# Patient Record
Sex: MALE | URBAN - METROPOLITAN AREA
[De-identification: names, ages, dates, MRNs, and addresses within clinical notes are randomized per-mention and may not be internally consistent; named-entity substitution may affect disease eponyms.]

---

## 2021-12-24 ENCOUNTER — EMERGENCY (EMERGENCY)
Facility: HOSPITAL | Age: 61
LOS: 1 days | Discharge: HOME | End: 2021-12-24
Attending: EMERGENCY MEDICINE
Payer: COMMERCIAL

## 2021-12-24 VITALS
DIASTOLIC BLOOD PRESSURE: 78 MMHG | SYSTOLIC BLOOD PRESSURE: 112 MMHG | RESPIRATION RATE: 18 BRPM | HEART RATE: 67 BPM | OXYGEN SATURATION: 100 %

## 2021-12-24 VITALS
HEIGHT: 71 IN | WEIGHT: 175.05 LBS | SYSTOLIC BLOOD PRESSURE: 122 MMHG | DIASTOLIC BLOOD PRESSURE: 65 MMHG | OXYGEN SATURATION: 100 % | RESPIRATION RATE: 20 BRPM | HEART RATE: 55 BPM

## 2021-12-24 LAB
ALBUMIN SERPL ELPH-MCNC: 4 G/DL — SIGNIFICANT CHANGE UP (ref 3.3–5)
ALP SERPL-CCNC: 92 U/L — SIGNIFICANT CHANGE UP (ref 40–120)
ALT FLD-CCNC: 11 U/L — SIGNIFICANT CHANGE UP (ref 10–45)
ANION GAP SERPL CALC-SCNC: 15 MMOL/L — SIGNIFICANT CHANGE UP (ref 5–17)
APTT BLD: 29.7 SEC — SIGNIFICANT CHANGE UP (ref 27.5–35.5)
AST SERPL-CCNC: 20 U/L — SIGNIFICANT CHANGE UP (ref 10–40)
BASOPHILS # BLD AUTO: 0.02 K/UL — SIGNIFICANT CHANGE UP (ref 0–0.2)
BASOPHILS NFR BLD AUTO: 0.4 % — SIGNIFICANT CHANGE UP (ref 0–2)
BILIRUB SERPL-MCNC: 0.1 MG/DL — LOW (ref 0.2–1.2)
BUN SERPL-MCNC: 29 MG/DL — HIGH (ref 7–23)
CALCIUM SERPL-MCNC: 8.8 MG/DL — SIGNIFICANT CHANGE UP (ref 8.4–10.5)
CHLORIDE SERPL-SCNC: 100 MMOL/L — SIGNIFICANT CHANGE UP (ref 96–108)
CO2 SERPL-SCNC: 23 MMOL/L — SIGNIFICANT CHANGE UP (ref 22–31)
CREAT SERPL-MCNC: 1.08 MG/DL — SIGNIFICANT CHANGE UP (ref 0.5–1.3)
EOSINOPHIL # BLD AUTO: 0.01 K/UL — SIGNIFICANT CHANGE UP (ref 0–0.5)
EOSINOPHIL NFR BLD AUTO: 0.2 % — SIGNIFICANT CHANGE UP (ref 0–6)
GLUCOSE SERPL-MCNC: 138 MG/DL — HIGH (ref 70–99)
HCT VFR BLD CALC: 41.4 % — SIGNIFICANT CHANGE UP (ref 39–50)
HGB BLD-MCNC: 13.5 G/DL — SIGNIFICANT CHANGE UP (ref 13–17)
HIV 1 & 2 AB SERPL IA.RAPID: SIGNIFICANT CHANGE UP
IMM GRANULOCYTES NFR BLD AUTO: 0.5 % — SIGNIFICANT CHANGE UP (ref 0–1.5)
INR BLD: 1.11 RATIO — SIGNIFICANT CHANGE UP (ref 0.88–1.16)
LYMPHOCYTES # BLD AUTO: 0.86 K/UL — LOW (ref 1–3.3)
LYMPHOCYTES # BLD AUTO: 15.5 % — SIGNIFICANT CHANGE UP (ref 13–44)
MCHC RBC-ENTMCNC: 28.1 PG — SIGNIFICANT CHANGE UP (ref 27–34)
MCHC RBC-ENTMCNC: 32.6 GM/DL — SIGNIFICANT CHANGE UP (ref 32–36)
MCV RBC AUTO: 86.1 FL — SIGNIFICANT CHANGE UP (ref 80–100)
MONOCYTES # BLD AUTO: 0.48 K/UL — SIGNIFICANT CHANGE UP (ref 0–0.9)
MONOCYTES NFR BLD AUTO: 8.6 % — SIGNIFICANT CHANGE UP (ref 2–14)
NEUTROPHILS # BLD AUTO: 4.16 K/UL — SIGNIFICANT CHANGE UP (ref 1.8–7.4)
NEUTROPHILS NFR BLD AUTO: 74.8 % — SIGNIFICANT CHANGE UP (ref 43–77)
NRBC # BLD: 0 /100 WBCS — SIGNIFICANT CHANGE UP (ref 0–0)
PLATELET # BLD AUTO: 173 K/UL — SIGNIFICANT CHANGE UP (ref 150–400)
POTASSIUM SERPL-MCNC: 3.9 MMOL/L — SIGNIFICANT CHANGE UP (ref 3.5–5.3)
POTASSIUM SERPL-SCNC: 3.9 MMOL/L — SIGNIFICANT CHANGE UP (ref 3.5–5.3)
PROT SERPL-MCNC: 6.9 G/DL — SIGNIFICANT CHANGE UP (ref 6–8.3)
PROTHROM AB SERPL-ACNC: 13.3 SEC — SIGNIFICANT CHANGE UP (ref 10.6–13.6)
RAPID RVP RESULT: DETECTED
RBC # BLD: 4.81 M/UL — SIGNIFICANT CHANGE UP (ref 4.2–5.8)
RBC # FLD: 14.8 % — HIGH (ref 10.3–14.5)
SARS-COV-2 RNA SPEC QL NAA+PROBE: DETECTED
SODIUM SERPL-SCNC: 138 MMOL/L — SIGNIFICANT CHANGE UP (ref 135–145)
TROPONIN T, HIGH SENSITIVITY RESULT: <6 NG/L — SIGNIFICANT CHANGE UP (ref 0–51)
WBC # BLD: 5.56 K/UL — SIGNIFICANT CHANGE UP (ref 3.8–10.5)
WBC # FLD AUTO: 5.56 K/UL — SIGNIFICANT CHANGE UP (ref 3.8–10.5)

## 2021-12-24 PROCEDURE — 82962 GLUCOSE BLOOD TEST: CPT

## 2021-12-24 PROCEDURE — 93010 ELECTROCARDIOGRAM REPORT: CPT | Mod: NC,59

## 2021-12-24 PROCEDURE — G1004: CPT

## 2021-12-24 PROCEDURE — 12002 RPR S/N/AX/GEN/TRNK2.6-7.5CM: CPT

## 2021-12-24 PROCEDURE — 96360 HYDRATION IV INFUSION INIT: CPT | Mod: XU

## 2021-12-24 PROCEDURE — 99285 EMERGENCY DEPT VISIT HI MDM: CPT | Mod: 25

## 2021-12-24 PROCEDURE — 70450 CT HEAD/BRAIN W/O DYE: CPT | Mod: 26,MG

## 2021-12-24 PROCEDURE — 85730 THROMBOPLASTIN TIME PARTIAL: CPT

## 2021-12-24 PROCEDURE — 0225U NFCT DS DNA&RNA 21 SARSCOV2: CPT

## 2021-12-24 PROCEDURE — 36415 COLL VENOUS BLD VENIPUNCTURE: CPT

## 2021-12-24 PROCEDURE — 70450 CT HEAD/BRAIN W/O DYE: CPT | Mod: MG

## 2021-12-24 PROCEDURE — 84484 ASSAY OF TROPONIN QUANT: CPT

## 2021-12-24 PROCEDURE — 12013 RPR F/E/E/N/L/M 2.6-5.0 CM: CPT | Mod: 59

## 2021-12-24 PROCEDURE — 90471 IMMUNIZATION ADMIN: CPT

## 2021-12-24 PROCEDURE — 85025 COMPLETE CBC W/AUTO DIFF WBC: CPT

## 2021-12-24 PROCEDURE — 85610 PROTHROMBIN TIME: CPT

## 2021-12-24 PROCEDURE — 99284 EMERGENCY DEPT VISIT MOD MDM: CPT | Mod: 25

## 2021-12-24 PROCEDURE — 86703 HIV-1/HIV-2 1 RESULT ANTBDY: CPT

## 2021-12-24 PROCEDURE — 90715 TDAP VACCINE 7 YRS/> IM: CPT

## 2021-12-24 PROCEDURE — 80053 COMPREHEN METABOLIC PANEL: CPT

## 2021-12-24 RX ORDER — TETANUS TOXOID, REDUCED DIPHTHERIA TOXOID AND ACELLULAR PERTUSSIS VACCINE, ADSORBED 5; 2.5; 8; 8; 2.5 [IU]/.5ML; [IU]/.5ML; UG/.5ML; UG/.5ML; UG/.5ML
0.5 SUSPENSION INTRAMUSCULAR ONCE
Refills: 0 | Status: COMPLETED | OUTPATIENT
Start: 2021-12-24 | End: 2021-12-24

## 2021-12-24 RX ORDER — SODIUM CHLORIDE 9 MG/ML
1000 INJECTION INTRAMUSCULAR; INTRAVENOUS; SUBCUTANEOUS ONCE
Refills: 0 | Status: COMPLETED | OUTPATIENT
Start: 2021-12-24 | End: 2021-12-24

## 2021-12-24 RX ADMIN — SODIUM CHLORIDE 1000 MILLILITER(S): 9 INJECTION INTRAMUSCULAR; INTRAVENOUS; SUBCUTANEOUS at 15:37

## 2021-12-24 RX ADMIN — SODIUM CHLORIDE 1000 MILLILITER(S): 9 INJECTION INTRAMUSCULAR; INTRAVENOUS; SUBCUTANEOUS at 16:40

## 2021-12-24 RX ADMIN — TETANUS TOXOID, REDUCED DIPHTHERIA TOXOID AND ACELLULAR PERTUSSIS VACCINE, ADSORBED 0.5 MILLILITER(S): 5; 2.5; 8; 8; 2.5 SUSPENSION INTRAMUSCULAR at 15:20

## 2021-12-24 NOTE — ED PROVIDER NOTE - PATIENT PORTAL LINK FT
You can access the FollowMyHealth Patient Portal offered by [facility] by registering at the following website: [email or web address]. By joining [facility]’s FollowMyHealth portal, you will also be able to view your health information using other applications (apps) compatible with our system.

## 2021-12-24 NOTE — ED PROVIDER NOTE - NSFOLLOWUPINSTRUCTIONS_ED_ALL_ED_FT
You were seen in the Emergency Room today because of a fall resulting in a head laceration. 4 staples were placed. Please go to an Urgent Care or return to the Emergency Room to remove them in 1 week.     Your lab and imaging results are included in your discharge paperwork.   You can take Tylenol 650mg and/or Ibuprofen 400mg as directed for pain. You can take both every 6 hours.     Please follow-up with your Primary Care Physician within 2 days for further management of your symptoms. We also recommend following up with a Cardiologist tomorrow to further evaluate your syncope. The contact information for the Clinic is included in your discharge paperwork. We have also let them know of your case, and they will be in contact with you.     Please return to the Emergency Room if:   •You have fever or chills.   •Your pain or swelling does not go away or gets worse, even after you take medicine.  •Your wound turns blue or white or feels cold and numb.  •Your wound is bleeding or draining pus.  •Your chest pain gets worse.  •You have a cough that gets worse, or you cough up blood.  •You have severe pain in your abdomen.  •You have sudden, unexplained discomfort in your arms, back, neck, or jaw.  •You have shortness of breath at any time.  •You feel nausea or you vomit.  •You suddenly feel lightheaded or dizzy, or you faint.  •You have severe weakness, or unexplained weakness or fatigue.  •Your heart begins to beat quickly, or it feels like it is skipping beats.

## 2021-12-24 NOTE — ED PROVIDER NOTE - PHYSICAL EXAMINATION
Gen: NAD, AOx3, able to make needs known, non-toxic  HEENT: EOMI, oral mucosa moist, normal conjunctiva. +1cm lac on posterior head, no cervical spine tenderness.   CV: pulses bilaterally, no sternal tenderness, no clavicle tenderness   Abd: soft, NTND, no guarding, no CVA tenderness   MSK: no visible bony deformities, no tenderness with palpation of the bilateral UE and LE, no hip or pelvis tenderness.  Neuro: No focal sensory or motor deficits  Skin: Warm, well perfused, no rash, no bruises   Psych: normal affect Gen: NAD, AOx3, able to make needs known, non-toxic  HEENT: EOMI, oral mucosa moist, normal conjunctiva. +1in lac on posterior head, no cervical spine tenderness.   CV: pulses bilaterally, no sternal tenderness, no clavicle tenderness   Abd: soft, NTND, no guarding, no CVA tenderness   MSK: no visible bony deformities, no tenderness with palpation of the bilateral UE and LE, no hip or pelvis tenderness.  Neuro: No focal sensory or motor deficits  Skin: Warm, well perfused, no rash, no bruises   Psych: normal affect

## 2021-12-24 NOTE — ED PROVIDER NOTE - OBJECTIVE STATEMENT
62yo man no PMH, 2 days of cold symptoms, presenting after syncopizing at the pharmacy when picking up medications. Patient states that he felt dizzy and collapsed for about a minute per bystanders. +head strike. , received 300mL of NS, no meds from EMS. Does not regularly see doctors. 62yo man no PMH, 2 days of cold symptoms, presenting after syncopizing at the pharmacy when picking up medications. Patient states that he felt dizzy and collapsed for about a minute per bystanders. +head strike. , received 300mL of NS, no meds from EMS. Saw a doctor 10 years ago. Complaining of head pain. 60yo man no PMH, 2 days of cough, congestion, presenting after syncopizing at the pharmacy when picking up medications. Patient states that he felt dizzy and collapsed for about a minute per bystanders. +head strike. , received 300mL of NS, no meds from EMS. Saw a doctor 10 years ago. Complaining of head pain.

## 2021-12-24 NOTE — ED PROVIDER NOTE - NS ED ROS FT
GENERAL: No fever, no chills  EYES: No change in vision  HEENT: No trouble swallowing or speaking, +head pain   CARDIAC: No chest pain  PULMONARY: No cough, no SOB  GI: No abdominal pain, no nausea or no vomiting, no diarrhea, no constipation  : No changes in urination  SKIN: No rashes  NEURO: No headache, no numbness  MSK: +shoulder pain  Otherwise as HPI or negative.

## 2021-12-24 NOTE — ED PROVIDER NOTE - CLINICAL SUMMARY MEDICAL DECISION MAKING FREE TEXT BOX
Dorian, PGY1 - vasovagal episode, no acute distressing factor noted, no follow-up, bradycardia, concerned for cardiovascular etiology. No SOB, no tachycardia, no tachypnea, decreased suspicion for PE, pneumonia. Cough symptoms, consider COVID+, pneumonia. labs, cxr, reassess. *The above represents an initial assessment/impression. Please refer to progress notes for potential changes in patient clinical course*

## 2021-12-24 NOTE — ED ADULT NURSE NOTE - OBJECTIVE STATEMENT
1237 61 yr old WM brought to ER  via ambulance on stretcher for further eval and tx of dizziness, and syncopal episode. [facility]  [facility]  [facility] prescription. Everyone sick at home with cold symptoms. Pt was COVID neg  a few days ago. Pt not vaccinated. smokes 1 pack/day cigarettes. Has not been to a Dr in 10 yrs. Laceration to posterior scalp with bleeding. Pt struck his head when he fainted. A&Ox4. PERRL. Denies chest pain, palp, SOB, fever, chills or bodyaches. Fall risk, contact and airborne isolation maintained

## 2021-12-24 NOTE — ED PROVIDER NOTE - ATTENDING CONTRIBUTION TO CARE
Nemes - 60yo man no PMH, 2 days of cough, congestion, presenting after syncopal episode at the pharmacy when picking up medications. Patient states that he felt dizzy and collapsed for about a minute per bystanders. +head strike. , received 300mL of NS, no meds from EMS. Saw a doctor 10 years ago. Complaining of head pain.  VS wnl, well appearing, in NAD. Moist mucosae, pink conjunctivae. Neck supple, CN/neuro intact. Lungs clear, cardiac wnl, no JVD. Abdomen soft/NT, no CVAT. No pedal edema, no calf TTP.   Low suspicion for cardiac etiology of syncope, likely dehydration/viral syndrome.  Will get labs, CT head, EKG, hydrate, reevaluate.  Will discuss didpo options, CDU vs DC w f/u Nemes - 62yo man no PMH, 2 days of cough, congestion, presenting after syncopal episode at the pharmacy when picking up medications. Patient states that he felt dizzy and collapsed for about a minute per bystanders. +head strike. , received 300mL of NS, no meds from EMS. Saw a doctor 10 years ago. Complaining of head pain.  VS wnl, well appearing, in NAD. Moist mucosae, pink conjunctivae. Neck supple, no C-spine TTP. 3cm lac to posterior/occipital scalp. CN/neuro intact. Lungs clear, cardiac wnl, no JVD. Abdomen soft/NT, no CVAT. No pedal edema, no calf TTP.   Low suspicion for cardiac etiology of syncope, likely dehydration/viral syndrome.  Will get labs, CT head, EKG, hydrate, reevaluate.  Will discuss dispo options, CDU vs DC w f/u

## 2022-01-05 ENCOUNTER — EMERGENCY (EMERGENCY)
Facility: HOSPITAL | Age: 62
LOS: 1 days | Discharge: HOME | End: 2022-01-05
Attending: EMERGENCY MEDICINE
Payer: COMMERCIAL

## 2022-01-05 VITALS
HEART RATE: 104 BPM | TEMPERATURE: 98 F | OXYGEN SATURATION: 99 % | HEIGHT: 71 IN | SYSTOLIC BLOOD PRESSURE: 134 MMHG | WEIGHT: 175.05 LBS | RESPIRATION RATE: 17 BRPM | DIASTOLIC BLOOD PRESSURE: 86 MMHG

## 2022-01-05 PROCEDURE — L9995: CPT

## 2022-01-05 PROCEDURE — G0463: CPT

## 2022-01-05 NOTE — ED ADULT NURSE NOTE - OBJECTIVE STATEMENT
Patient  is  alert  and  oriented x3. Color is good and skin warm to touch.   He  sustained a laceration to his scalp about 13 days ago. He has 4 staples to his occipital area. Wound is dry , clean and healed. He  is here for staples removal.

## 2022-01-05 NOTE — ED PROVIDER NOTE - ATTENDING CONTRIBUTION TO CARE
pt is a 62 y/o male here for staple removal to occipital region of head, no signs of infection. pt without complaints, placed 4 staples 12/24.

## 2022-01-05 NOTE — ED ADULT TRIAGE NOTE - TEMPERATURE IN CELSIUS (DEGREES C)
36.8
Implemented All Universal Safety Interventions:  [place] to call system. Call bell, personal items and telephone within reach. Instruct patient to call for assistance. Room bathroom lighting operational. Non-slip footwear when patient is off stretcher. Physically safe environment: no spills, clutter or unnecessary equipment. Stretcher in lowest position, wheels locked, appropriate side rails in place.

## 2022-01-05 NOTE — ED PROVIDER NOTE - NSFOLLOWUPINSTRUCTIONS_ED_ALL_ED_FT
Please follow up with your primary care doctor as soon as possible for review of your ED visit.   Please return to the ED if you develop any new or worsening symptoms including bleeding from the wound.

## 2022-01-05 NOTE — ED PROVIDER NOTE - NS ED ROS FT
ROS:  GENERAL: No fever, no chills  EYES: no change in vision  HEENT: no trouble swallowing, no trouble speaking  CARDIAC: no chest pain  PULMONARY: no cough, no shortness of breath  GI: no abdominal pain, no nausea, no vomiting, no diarrhea, no constipation  : No dysuria, no frequency, no change in appearance, or odor of urine  SKIN: no rashes, staples in head  NEURO: no headache, no weakness  MSK: No joint pain

## 2022-01-05 NOTE — ED PROVIDER NOTE - PATIENT PORTAL LINK FT
You can access the FollowMyHealth Patient Portal offered by [facility] by registering at the following website: [email or web address]. By joining [employer]’s FollowMyHealth portal, you will also be able to view your health information using other applications (apps) compatible with our system.

## 2022-01-05 NOTE — ED PROVIDER NOTE - CLINICAL SUMMARY MEDICAL DECISION MAKING FREE TEXT BOX
See Attending Note See Attending Note    Paola: Staples removed - see procedure note.   Will DC    Will discharge. Discussed the case with patient and/or family.  Instructed urgent follow up with primary care doctor for review of their ED visit (labs, radiology, etc.) Also instructed follow up for any specialty services as needed. Patient and/or family are aware to return to ED with any new or worsening symptoms. All questions were answered and the opportunity for to ask questions were given. Patient and/or family verbalized understanding of the above instructions.

## 2022-01-05 NOTE — ED PROVIDER NOTE - PHYSICAL EXAMINATION
I have review the triage vital signs   Const: Well nourished and developed. Appears stated age. Awake, alert, in no acute distress.  Head: Normocephalic and atraumatic.  Eyes: No conjunctival pallor, PERRL, EOMI  Ear, Nose, Throat: Normal appearing externally. No external throat swelling.  Neck: Ranging without difficulty or pain.   Respiratory: No acute respiratory distress. Lungs CTAB.  Cardiovascular: Regular rate and rhythm. No edema.   Abdominal: Soft, nontender, nondistended. No rebound or guarding.  MSK: No gross deformity   Neuro: The patient is alert, conversive, moving all extremities equally and spontaneously   Skin: Warm, dry, intact. 4 staples along occiput. wound healed well,

## 2022-01-05 NOTE — ED PROVIDER NOTE - OBJECTIVE STATEMENT
61yM presents for suture removal, he was seen in the ED 12/24 for syncope where he fell backwards and sustained a laceration to the back of his head. 4 staples were placed at that time. He was offered admission at that time but declined. He was done well since the visit. No pain to the head. No bleeding. No syncope.